# Patient Record
Sex: FEMALE | Race: BLACK OR AFRICAN AMERICAN | NOT HISPANIC OR LATINO | Employment: STUDENT | URBAN - METROPOLITAN AREA
[De-identification: names, ages, dates, MRNs, and addresses within clinical notes are randomized per-mention and may not be internally consistent; named-entity substitution may affect disease eponyms.]

---

## 2017-01-03 ENCOUNTER — ALLSCRIPTS OFFICE VISIT (OUTPATIENT)
Dept: OTHER | Facility: OTHER | Age: 22
End: 2017-01-03

## 2017-01-05 ENCOUNTER — HOSPITAL ENCOUNTER (EMERGENCY)
Facility: HOSPITAL | Age: 22
Discharge: HOME | End: 2017-01-05
Attending: EMERGENCY MEDICINE | Admitting: EMERGENCY MEDICINE
Payer: COMMERCIAL

## 2017-01-05 ENCOUNTER — APPOINTMENT (EMERGENCY)
Dept: RADIOLOGY | Facility: HOSPITAL | Age: 22
End: 2017-01-05
Payer: COMMERCIAL

## 2017-01-05 VITALS
TEMPERATURE: 99 F | DIASTOLIC BLOOD PRESSURE: 61 MMHG | WEIGHT: 140 LBS | SYSTOLIC BLOOD PRESSURE: 119 MMHG | OXYGEN SATURATION: 96 % | RESPIRATION RATE: 18 BRPM | HEART RATE: 84 BPM | BODY MASS INDEX: 24.03 KG/M2

## 2017-01-05 DIAGNOSIS — J40 BRONCHITIS: Primary | ICD-10-CM

## 2017-01-05 LAB
FLUAV AG SPEC QL IA: NEGATIVE
FLUBV AG SPEC QL IA: NEGATIVE

## 2017-01-05 PROCEDURE — 87400 INFLUENZA A/B EACH AG IA: CPT | Performed by: EMERGENCY MEDICINE

## 2017-01-05 PROCEDURE — 99283 EMERGENCY DEPT VISIT LOW MDM: CPT

## 2017-01-05 PROCEDURE — 71020 HB CHEST X-RAY 2VW FRONTAL&LATL: CPT

## 2017-01-05 PROCEDURE — 87798 DETECT AGENT NOS DNA AMP: CPT | Performed by: EMERGENCY MEDICINE

## 2017-01-05 RX ORDER — AZITHROMYCIN 250 MG/1
250 TABLET, FILM COATED ORAL DAILY
Qty: 5 TABLET | Refills: 0 | Status: SHIPPED | OUTPATIENT
Start: 2017-01-05 | End: 2017-01-10

## 2017-01-05 RX ORDER — IPRATROPIUM BROMIDE AND ALBUTEROL SULFATE 2.5; .5 MG/3ML; MG/3ML
SOLUTION RESPIRATORY (INHALATION)
Status: COMPLETED
Start: 2017-01-05 | End: 2017-01-05

## 2017-01-05 RX ORDER — ARIPIPRAZOLE 10 MG/1
TABLET ORAL
COMMUNITY
Start: 2016-12-30

## 2017-01-05 RX ORDER — IPRATROPIUM BROMIDE AND ALBUTEROL SULFATE 2.5; .5 MG/3ML; MG/3ML
3 SOLUTION RESPIRATORY (INHALATION)
Status: DISCONTINUED | OUTPATIENT
Start: 2017-01-05 | End: 2017-01-05 | Stop reason: HOSPADM

## 2017-01-05 RX ADMIN — IPRATROPIUM BROMIDE AND ALBUTEROL SULFATE 3 ML: 2.5; .5 SOLUTION RESPIRATORY (INHALATION) at 17:50

## 2017-01-05 RX ADMIN — IPRATROPIUM BROMIDE AND ALBUTEROL SULFATE 3 ML: .5; 3 SOLUTION RESPIRATORY (INHALATION) at 17:50

## 2017-01-06 LAB
FLUAV AG SPEC QL: NORMAL
FLUBV AG SPEC QL: NORMAL
RSV B RNA SPEC QL NAA+PROBE: NORMAL

## 2017-01-23 ENCOUNTER — ALLSCRIPTS OFFICE VISIT (OUTPATIENT)
Dept: OTHER | Facility: OTHER | Age: 22
End: 2017-01-23

## 2017-01-23 DIAGNOSIS — M75.82 OTHER SHOULDER LESIONS, LEFT SHOULDER: ICD-10-CM

## 2017-01-23 DIAGNOSIS — M75.22 BICIPITAL TENDINITIS OF LEFT SHOULDER: ICD-10-CM

## 2017-01-30 ENCOUNTER — GENERIC CONVERSION - ENCOUNTER (OUTPATIENT)
Dept: OTHER | Facility: OTHER | Age: 22
End: 2017-01-30

## 2017-02-27 ENCOUNTER — ALLSCRIPTS OFFICE VISIT (OUTPATIENT)
Dept: OTHER | Facility: OTHER | Age: 22
End: 2017-02-27

## 2017-03-09 ENCOUNTER — ALLSCRIPTS OFFICE VISIT (OUTPATIENT)
Dept: OTHER | Facility: OTHER | Age: 22
End: 2017-03-09

## 2017-03-15 ENCOUNTER — HOSPITAL ENCOUNTER (OUTPATIENT)
Dept: RADIOLOGY | Facility: CLINIC | Age: 22
Discharge: HOME | End: 2017-03-15
Payer: COMMERCIAL

## 2017-03-15 ENCOUNTER — ALLSCRIPTS OFFICE VISIT (OUTPATIENT)
Dept: OTHER | Facility: OTHER | Age: 22
End: 2017-03-15

## 2017-03-15 DIAGNOSIS — M25.511 PAIN IN RIGHT SHOULDER: ICD-10-CM

## 2017-03-15 PROCEDURE — 73030 X-RAY EXAM OF SHOULDER: CPT

## 2018-01-13 VITALS
SYSTOLIC BLOOD PRESSURE: 112 MMHG | DIASTOLIC BLOOD PRESSURE: 76 MMHG | HEIGHT: 64 IN | WEIGHT: 138 LBS | BODY MASS INDEX: 23.56 KG/M2

## 2018-01-13 VITALS
WEIGHT: 140 LBS | SYSTOLIC BLOOD PRESSURE: 110 MMHG | HEIGHT: 64 IN | DIASTOLIC BLOOD PRESSURE: 70 MMHG | BODY MASS INDEX: 23.9 KG/M2

## 2018-01-15 VITALS
DIASTOLIC BLOOD PRESSURE: 68 MMHG | BODY MASS INDEX: 23.05 KG/M2 | WEIGHT: 135 LBS | HEART RATE: 72 BPM | SYSTOLIC BLOOD PRESSURE: 120 MMHG | HEIGHT: 64 IN

## 2018-01-16 NOTE — MISCELLANEOUS
Message  Return to work or school:   Kayla Morris is under my professional care  She was seen in my office on 1/22/2016   She is able to return to work on  1/24/2016      Dr Didi Bocanegra          Signatures   Electronically signed by : PRAMOD Santos ; Jan 22 2016  4:29PM EST                       (Author)    Electronically signed by : PRAMOD Santos ; Jan 22 2016  4:30PM EST                       (Author)

## 2018-01-16 NOTE — PROGRESS NOTES
Assessment    1  Depression with anxiety (300 4) (F41 8)    Plan  Asthma, Blurred vision, Depression with anxiety, Health Maintenance, Headache    · Venlafaxine HCl - 50 MG Oral Tablet; take 1 tab daily    Discussion/Summary    Anxiety: Effexor is increased to 50mg daily and letter is given for school to request a single room  F/u in a month or sooner if anything changes  Possible side effects of new medications were reviewed with the patient/guardian today  The treatment plan was reviewed with the patient/guardian  The patient/guardian understands and agrees with the treatment plan   The patient was counseled regarding  Chief Complaint  PT is here for FU medicine  PT states that she feels okay on the medicine  History of Present Illness  21yo f is here with for medication refill  Pt had hx of anxiety and was seen by her school therapist who recommended for increase dose  Pt states she is doing better  Pt states she is having difficulty living with a roommate and was told to obtain a letter from her doctor to request a single room  Review of Systems    Constitutional: not feeling poorly and not feeling tired  Cardiovascular: no chest pain and no palpitations  Neurological: no headache  Psychiatric: anxiety and sleep disturbances, but not suicidal and no depression  Active Problems    1  Asthma (493 90) (J45 909)   2  Blurred vision (368 8) (H53 8)   3  Depression with anxiety (300 4) (F41 8)   4  Headache (784 0) (R51)   5  Temporal pain (784 0) (R51)    Past Medical History    The active problems and past medical history were reviewed and updated today  Surgical History    The surgical history was reviewed and updated today  Family History    1  No pertinent family history    2  Family history of No Significant Family History    The family history was reviewed and updated today         Social History    · Currently In School   · Never A Smoker   · Never Drank Alcohol   · No drug use  The social history was reviewed and updated today  The social history was reviewed and is unchanged  Current Meds    The medication list was reviewed and updated today  Allergies    1  No Known Drug Allergies    2  No Known Food Allergies    Vitals  Vital Signs [Data Includes: Current Encounter]    Recorded: [id number] 03:10PM   Temperature 96 3 F   Heart Rate 85   Respiration 20   Systolic 98   Diastolic 64   Height 5 ft 4 in   2-20 Stature Percentile 45 %   Weight 126 lb    2-20 Weight Percentile 46 %   BMI Calculated 21 63   BMI Percentile 49 %   BSA Calculated 1 61   O2 Saturation 100     Physical Exam    Constitutional   General appearance: No acute distress, well appearing and well nourished  Pulmonary   Auscultation of lungs: Clear to auscultation  Cardiovascular   Auscultation of heart: Normal rate and rhythm, normal S1 and S2, without murmurs  Attending Note  Attending Note: I agree with the Resident management plan as it was presented to me  I agree with the Resident's note        Signatures   Electronically signed by : PRAMOD Aguilar ; Jan 22 2016  4:04PM EST                       (Author)    Electronically signed by : EDWIN Florentino ; Jan 22 2016  8:19PM EST                       (Author)

## 2018-01-17 NOTE — MISCELLANEOUS
Message   Recorded as Task   Date: 01/30/2017 10:39 AM, Created By: Papito Moreno   Task Name: Med Renewal Request   Assigned To: [facility],team   Regarding Patient: Agnes Soto, Status: Active   Comment:    Papito Moreno - 30 Jan 2017 10:39 AM     TASK CREATED  dr Lorretta Goldberg    pt needs med osmin   she is in school- out of med, need it urgent today if is possible  citalopram hydrobaromide 20mg      urgent are in School    If need to speak to the pt   [phone] cell phone  Dut,Brandie - 30 Jan 2017 1:34 PM     TASK EDITED  spoke with patient  states she was getting her celexa from outpatient therapy group in [facility]  now she's out of it  states that she speaks with school psychologist   I advised that I will refill only for 2 months and after than she will need to get refills from psychiatrist or psychologists  she should also come and see PCP to discuss her medications  pt agrees with plan        Plan  PMH: Depression with anxiety    · Citalopram Hydrobromide 20 MG Oral Tablet; TAKE ONE TABLET BY MOUTH  ONCE DAILY AS DIRECTED    Signatures   Electronically signed by :  PRAMOD Shannon ; Jan 30 2017  1:35PM EST                       (Author)